# Patient Record
Sex: MALE | Race: WHITE
[De-identification: names, ages, dates, MRNs, and addresses within clinical notes are randomized per-mention and may not be internally consistent; named-entity substitution may affect disease eponyms.]

---

## 2017-10-31 ENCOUNTER — HOSPITAL ENCOUNTER (OUTPATIENT)
Dept: HOSPITAL 92 - ERS | Age: 62
Setting detail: OBSERVATION
LOS: 1 days | Discharge: HOME | End: 2017-11-01
Attending: INTERNAL MEDICINE | Admitting: INTERNAL MEDICINE
Payer: COMMERCIAL

## 2017-10-31 VITALS — BODY MASS INDEX: 37.3 KG/M2

## 2017-10-31 DIAGNOSIS — Z79.82: ICD-10-CM

## 2017-10-31 DIAGNOSIS — G93.41: ICD-10-CM

## 2017-10-31 DIAGNOSIS — E66.9: ICD-10-CM

## 2017-10-31 DIAGNOSIS — Z79.899: ICD-10-CM

## 2017-10-31 DIAGNOSIS — Z79.84: ICD-10-CM

## 2017-10-31 DIAGNOSIS — K21.9: ICD-10-CM

## 2017-10-31 DIAGNOSIS — E78.5: ICD-10-CM

## 2017-10-31 DIAGNOSIS — G45.9: Primary | ICD-10-CM

## 2017-10-31 DIAGNOSIS — Z79.02: ICD-10-CM

## 2017-10-31 DIAGNOSIS — E11.9: ICD-10-CM

## 2017-10-31 DIAGNOSIS — I10: ICD-10-CM

## 2017-10-31 DIAGNOSIS — Z90.49: ICD-10-CM

## 2017-10-31 DIAGNOSIS — Z91.041: ICD-10-CM

## 2017-10-31 DIAGNOSIS — Z90.79: ICD-10-CM

## 2017-10-31 DIAGNOSIS — J45.909: ICD-10-CM

## 2017-10-31 DIAGNOSIS — Z99.89: ICD-10-CM

## 2017-10-31 DIAGNOSIS — K58.9: ICD-10-CM

## 2017-10-31 DIAGNOSIS — G47.33: ICD-10-CM

## 2017-10-31 LAB
ALP SERPL-CCNC: 74 U/L (ref 40–150)
ALT SERPL W P-5'-P-CCNC: 30 U/L (ref 8–55)
ANION GAP SERPL CALC-SCNC: 17 MMOL/L (ref 10–20)
APAP SERPL-MCNC: (no result) MCG/ML (ref 10–30)
APTT PPP: 34.6 SEC (ref 22.9–36.1)
AST SERPL-CCNC: 24 U/L (ref 5–34)
BASOPHILS # BLD AUTO: 0.1 THOU/UL (ref 0–0.2)
BASOPHILS NFR BLD AUTO: 1.4 % (ref 0–1)
BILIRUB SERPL-MCNC: 0.5 MG/DL (ref 0.2–1.2)
BUN SERPL-MCNC: 12 MG/DL (ref 8.4–25.7)
CALCIUM SERPL-MCNC: 9.8 MG/DL (ref 7.8–10.44)
CHLORIDE SERPL-SCNC: 104 MMOL/L (ref 98–107)
CO2 SERPL-SCNC: 22 MMOL/L (ref 23–31)
CREAT CL PREDICTED SERPL C-G-VRATE: 0 ML/MIN (ref 70–130)
EOSINOPHIL # BLD AUTO: 0.2 THOU/UL (ref 0–0.7)
EOSINOPHIL NFR BLD AUTO: 2.7 % (ref 0–10)
GLOBULIN SER CALC-MCNC: 3.7 G/DL (ref 2.4–3.5)
HCT VFR BLD CALC: 42.7 % (ref 42–52)
LYMPHOCYTES # BLD: 1.8 THOU/UL (ref 1.2–3.4)
LYMPHOCYTES NFR BLD AUTO: 20.7 % (ref 21–51)
MODIFIED ALLEN'S TEST: POSITIVE
MONOCYTES # BLD AUTO: 0.7 THOU/UL (ref 0.11–0.59)
MONOCYTES NFR BLD AUTO: 7.9 % (ref 0–10)
NEUTROPHILS # BLD AUTO: 5.9 THOU/UL (ref 1.4–6.5)
PROTHROMBIN TIME: 13.3 SEC (ref 12–14.7)
RBC # BLD AUTO: 5.09 MILL/UL (ref 4.7–6.1)
SALICYLATES SERPL-MCNC: (no result) MG/DL (ref 15–30)
SODIUM SERPL-SCNC: 140 MMOL/L (ref 135–148)
VENT: NO
WBC # BLD AUTO: 8.8 THOU/UL (ref 4.8–10.8)

## 2017-10-31 PROCEDURE — G0378 HOSPITAL OBSERVATION PER HR: HCPCS

## 2017-10-31 PROCEDURE — 85610 PROTHROMBIN TIME: CPT

## 2017-10-31 PROCEDURE — 85303 CLOT INHIBIT PROT C ACTIVITY: CPT

## 2017-10-31 PROCEDURE — 84443 ASSAY THYROID STIM HORMONE: CPT

## 2017-10-31 PROCEDURE — 96374 THER/PROPH/DIAG INJ IV PUSH: CPT

## 2017-10-31 PROCEDURE — 80053 COMPREHEN METABOLIC PANEL: CPT

## 2017-10-31 PROCEDURE — 82805 BLOOD GASES W/O2 SATURATION: CPT

## 2017-10-31 PROCEDURE — 87040 BLOOD CULTURE FOR BACTERIA: CPT

## 2017-10-31 PROCEDURE — 70450 CT HEAD/BRAIN W/O DYE: CPT

## 2017-10-31 PROCEDURE — 85240 CLOT FACTOR VIII AHG 1 STAGE: CPT

## 2017-10-31 PROCEDURE — 70544 MR ANGIOGRAPHY HEAD W/O DYE: CPT

## 2017-10-31 PROCEDURE — 93880 EXTRACRANIAL BILAT STUDY: CPT

## 2017-10-31 PROCEDURE — 85305 CLOT INHIBIT PROT S TOTAL: CPT

## 2017-10-31 PROCEDURE — 85025 COMPLETE CBC W/AUTO DIFF WBC: CPT

## 2017-10-31 PROCEDURE — 85307 ASSAY ACTIVATED PROTEIN C: CPT

## 2017-10-31 PROCEDURE — 36416 COLLJ CAPILLARY BLOOD SPEC: CPT

## 2017-10-31 PROCEDURE — 85598 HEXAGNAL PHOSPH PLTLT NEUTRL: CPT

## 2017-10-31 PROCEDURE — 96361 HYDRATE IV INFUSION ADD-ON: CPT

## 2017-10-31 PROCEDURE — 93005 ELECTROCARDIOGRAM TRACING: CPT

## 2017-10-31 PROCEDURE — 70547 MR ANGIOGRAPHY NECK W/O DYE: CPT

## 2017-10-31 PROCEDURE — 80048 BASIC METABOLIC PNL TOTAL CA: CPT

## 2017-10-31 PROCEDURE — 80307 DRUG TEST PRSMV CHEM ANLYZR: CPT

## 2017-10-31 PROCEDURE — 82140 ASSAY OF AMMONIA: CPT

## 2017-10-31 PROCEDURE — 87086 URINE CULTURE/COLONY COUNT: CPT

## 2017-10-31 PROCEDURE — 70551 MRI BRAIN STEM W/O DYE: CPT

## 2017-10-31 PROCEDURE — 96375 TX/PRO/DX INJ NEW DRUG ADDON: CPT

## 2017-10-31 PROCEDURE — 86038 ANTINUCLEAR ANTIBODIES: CPT

## 2017-10-31 PROCEDURE — 82746 ASSAY OF FOLIC ACID SERUM: CPT

## 2017-10-31 PROCEDURE — 96372 THER/PROPH/DIAG INJ SC/IM: CPT

## 2017-10-31 PROCEDURE — 80061 LIPID PANEL: CPT

## 2017-10-31 PROCEDURE — 80306 DRUG TEST PRSMV INSTRMNT: CPT

## 2017-10-31 PROCEDURE — 85300 ANTITHROMBIN III ACTIVITY: CPT

## 2017-10-31 PROCEDURE — 85730 THROMBOPLASTIN TIME PARTIAL: CPT

## 2017-10-31 PROCEDURE — 82607 VITAMIN B-12: CPT

## 2017-10-31 PROCEDURE — 81240 F2 GENE: CPT

## 2017-10-31 PROCEDURE — 81003 URINALYSIS AUTO W/O SCOPE: CPT

## 2017-10-31 PROCEDURE — 85379 FIBRIN DEGRADATION QUANT: CPT

## 2017-10-31 PROCEDURE — 83090 ASSAY OF HOMOCYSTEINE: CPT

## 2017-10-31 PROCEDURE — 83036 HEMOGLOBIN GLYCOSYLATED A1C: CPT

## 2017-10-31 PROCEDURE — 36415 COLL VENOUS BLD VENIPUNCTURE: CPT

## 2017-10-31 NOTE — CT
BRAIN CT WITHOUT IV CONTRAST:

 

History: 

62-year-old male with altered mental status. 

 

FINDINGS: 

No focal mass or midline shift. No intra or extraaxial hemorrhage. Sinuses and mastoids are clear of
 acute process. 

 

IMPRESSION: 

No acute intracranial process. No mass or bleed.

 

POS: [facility]

## 2017-10-31 NOTE — HP
REASON FOR ADMISSION:  Acute encephalopathy, possible CVA.

 

HISTORY OF PRESENT ILLNESS:  The patient is a professor at [email or web address].  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 
this was around 12:00 noon.  When he came out, he was accompanied by one of his 
students.  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  He did not know 
the date and was appearing confused.  He had also complained of blurry vision.  
Currently, he has episodes of him not recollecting the sentence that he just 
said before.  He is able to move all 4 extremities.  No complaints of fever, 
cough, or expectoration.  No complaints of urinary symptoms including frequency 
or urgency.  He walked after the lecture and the student took him to his truck 
to get him to the emergency room.  He did not sway or complaint of dizziness.  
Currently, he has no chest pain or palpitation.  He drinks a lot of coffee, but 
is not sure if he was hydrating himself with water.  The patient responds to 
verbal questions appropriately, but seems to forget the answers he gave 
immediately.  He has not had any similar complaints before.  He recognizes his 
wife, daughter and the student who keeps saying that he is the one of students 
he has had.  He had a normal day yesterday with no untoward events.  Wife at 
bedside adds that his job is stressful as a professor.  The student at bedside 
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 
includes legal regulation, etc.

 

PAST MEDICAL AND SURGICAL HISTORY:  History of diabetes mellitus, type 2; asthma
, which is very mild with no recent flareups; obstructive sleep apnea; he is 
compliant with his CPAP machine; history of irritable bowel syndrome with 
frequent diarrhea, but no diarrhea in the last 24 hours.  In fact, he does not 
recollect when he had a loose stool.  Gastroesophageal reflux disease and 
hypertension.  Prostatectomy, cholecystectomy and TURP.

 

CURRENT MEDICATIONS:  Metformin 250 mg p.o. twice daily, aspirin 325 mg p.o. 
daily, omeprazole 20 mg p.o. twice daily, fish oil 1200 mg p.o. daily, Flonase 
1 spray to both nares daily, Singulair 10 mg daily, Coreg 12.5 mg p.o. twice 
daily.

 

ALLERGIES:  No known drug allergies, but apparently he is allergic to IODINE, 
which caused hives on previous occasion.

 

PERSONAL HISTORY:  Does not abuse alcohol or drugs.  No history of smoking.  He 
is a professor at [employer].

 

FAMILY HISTORY:  Mother is living and is 94 years old.  Father  of old age 
at 94 years, he had dementia as well.

 

REVIEW OF SYSTEMS:  The following complete review of systems was negative, 
unless otherwise mentioned in the HPI or below:

Constitutional:  Weight loss or gain, ability to conduct usual activities.

Skin:  Rash, itching.

Eyes:  Double vision, pain.

ENT/Mouth:  Nose bleeding, neck stiffness, pain, tenderness.

Cardiovascular:  Palpitations, dyspnea on exertion, orthopnea.

Respiratory:  Shortness of breath, wheezing, cough, hemoptysis, fever or night 
sweats.

Gastrointestinal:  Poor appetite, abdominal pain, heartburn, nausea, vomiting, 
constipation, or diarrhea.

Genitourinary:  Urgency, frequency, dysuria, nocturia.

Musculoskeletal:  Pain, swelling.

Neurologic/Psychiatric:  Anxiety, depression.

Allergy/Immunologic:  Skin rash, bleeding tendency.

 

PHYSICAL EXAMINATION:

GENERAL:  The patient is a 62-year-old male, who is currently not in any acute 
distress.

VITAL SIGNS:  Blood pressure 158/76, pulse 90 per minute, respiratory rate 20 
per minute, temperature 98.4 degrees Fahrenheit, saturating 100% on room air.

NECK:  Supple, no elevated JVD.

HEENT:  Eyes:  Extraocular muscles intact.  Pupils reacting to light.  Oral 
cavity, mucous membranes are dry.  No exudates or congestion.

CARDIOVASCULAR SYSTEM:  S1, S2 heard.  Regular rhythm.

RESPIRATORY SYSTEM:  Air entry 1+ bilaterally.  No rales or rhonchi.

ABDOMEN:  Soft, bowel sounds heard.  No tenderness, rigidity or guarding.

EXTREMITIES:  No peripheral edema or calf tenderness.

CENTRAL NERVOUS SYSTEM:  The patient is alert, awake, and oriented.  Cranial 
nerves are grossly intact.  Motor system, strength is 5/5, tone is normal.  
Reflexes are 2+ bilateral.  Cerebellar signs are grossly intact.  Gait was not 
tested.

PSYCHIATRIC:  The patient is a bit anxious at present.  Otherwise, no 
hallucinations or delusions.

 

LABORATORY AND X-RAY FINDINGS:  CT brain shows no acute infarct or bleed.  
There was no focal mass or midline shift seen.  EKG, normal sinus rhythm at 73 
beats per minute.  There are nonspecific ST-T wave changes.  Plasma alcohol was 
less than 10.  Urine drug screen was negative.  UA is negative for any 
infection.  Serum bicarbonate 22, BUN 12, creatinine 0.9.  Liver enzymes within 
normal limits.  Albumin is 4.3.  Blood gas shows a pH of 7.46, pCO2 is 33, 
white count of 8, H\T\H are 14 and 42, platelet count 286 with 67% neutrophils, 
MCV is 83.

 

CLINICAL IMPRESSION AND PLAN:  The patient will be under observation on the 
stroke unit for acute encephalopathy with possible cerebrovascular accident 
with cognitive deficits.  He has had a CT brain done, which has not shown any 
acute abnormalities.  The patient is allergic to IODINE, and we will obtain an 
MRI in the morning.  As the patient takes aspirin full dose at home, we will 
place him on Plavix for now.  We will continue his Coreg and Singulair as 
before.  He will be gently hydrated with normal saline at 125 mL per hour.  The 
patient appears to have mild dehydration in addition to him drinking a lot of 
coffee.  We will also place him on a small dose of Lipitor for now.  Neurology 
consultation and stroke evidence based protocol will be followed.  His CO2 
appears to be normal.  The patient's methemoglobin and carboxyhemoglobin levels 
are within normal limits as well on the blood gas.  We will obtain a TSH level 
stat and one more in the morning for an accurate level.  The reason behind his 
cognitive dysfunction is unclear at present.  Please note the patient does not 
appear to have any motor deficits.  He is a right-handed person with normal 
strength in all four extremities at present.

 

NOEMY

## 2017-11-01 VITALS — TEMPERATURE: 98 F

## 2017-11-01 LAB
ANION GAP SERPL CALC-SCNC: 11 MMOL/L (ref 10–20)
BASOPHILS # BLD AUTO: 0 THOU/UL (ref 0–0.2)
BASOPHILS NFR BLD AUTO: 0.6 % (ref 0–1)
BUN SERPL-MCNC: 12 MG/DL (ref 8.4–25.7)
CALCIUM SERPL-MCNC: 9 MG/DL (ref 7.8–10.44)
CHLORIDE SERPL-SCNC: 107 MMOL/L (ref 98–107)
CHOLEST SERPL-MCNC: 187 MG/DL
CO2 SERPL-SCNC: 25 MMOL/L (ref 23–31)
CREAT CL PREDICTED SERPL C-G-VRATE: 155 ML/MIN (ref 70–130)
EOSINOPHIL # BLD AUTO: 0.2 THOU/UL (ref 0–0.7)
EOSINOPHIL NFR BLD AUTO: 3.4 % (ref 0–10)
HCT VFR BLD CALC: 38.9 % (ref 42–52)
LDLC SERPL CALC-MCNC: 102 MG/DL
LYMPHOCYTES # BLD: 1.8 THOU/UL (ref 1.2–3.4)
LYMPHOCYTES NFR BLD AUTO: 25.6 % (ref 21–51)
MONOCYTES # BLD AUTO: 0.7 THOU/UL (ref 0.11–0.59)
MONOCYTES NFR BLD AUTO: 9.8 % (ref 0–10)
NEUTROPHILS # BLD AUTO: 4.3 THOU/UL (ref 1.4–6.5)
RBC # BLD AUTO: 4.51 MILL/UL (ref 4.7–6.1)
WBC # BLD AUTO: 7.1 THOU/UL (ref 4.8–10.8)

## 2017-11-01 NOTE — MRI
MR ANGIOGRAPHY OF BRAIN:

 

HISTORY:

Memory loss and confusion. Possible CVA.

 

TECHNIQUE:

Film performed without contrast using time-of-flight images.

 

FINDINGS:

Intracranial internal carotid arteries appear unremarkable.  The  anterior cerebral arteries, middle
 cerebral arteries, and posterior cerebral arteries appear unremarkable.  Basilar artery is unremark
able.

 

IMPRESSION:

Unremarkable magnetic resonance angiography of cerebral circulation.

 

[facility]: [facility]

## 2017-11-01 NOTE — CON
DATE OF CONSULTATION:  11/01/2017

 

LOCATION:  [address] [place] [address].

 

CONSULTING PHYSICIAN:  Dr. Quevedo.

 

REASON FOR CONSULTATION:  Acute episode of confusion, rule out stroke.

 

HISTORY OF PRESENT ILLNESS:  The history is obtained from the chart review and from the patient who 
was able to provide history.  His family members, his wife and his daughter were also present in the
 room who were able to provide history.  The patient is a 62-year-old right-handed male with a past 
medical history of diabetes, sleep apnea, who uses CPAP machine at home, but that has not been check
ed or evaluated for a long time, history of irritable bowel syndrome, reflux disease, hypertension, 
prostatectomy, who presented with the following symptoms.  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  The patient states 
that he does not remember anything.  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  He appeare
d to be confused.  According to the wife and the daughter, he was confused, the confusion started ar
ound 12:45 and lasted until last night.  He would ask the same questions, asked the date and the yea
r repeatedly.  He did not remember what he did in the class.  He denies any similar symptoms in the 
past.  He denied any chest pain, fever, cough, congestion, palpitation, dizziness.  There was no los
s of consciousness, no seizure activity reported.  There was no weakness noted.  No facial droop not
ed.  He said that his vision was blurry and foggy.  No speech changes.  He was able to walk without 
any focal weakness.  Because of the confusion, he was brought to the ER and was subsequently admitte
d to the hospital for further workup.

 

Currently, patient feels like he is back to his normal self.  He just has a dull headache.  He has h
istory of dull sinus headaches, but no history of migraine headaches, no prior history of seizures, 
no reported seizure activity with this episode.  No history of strokes in the past.  He only takes 3
25 mg aspirin at home and he is compliant with that.  When asked he denies any unusual level of stre
ss, he does mention that even though he uses CPAP at home every night, he does wake up multiple time
s at night with breathing issues.  He has not had his CPAP checked in a long time.  The CPAP has hel
ped with the snoring, but he still wakes up multiple times at night.  He has been feeling tired and 
fatigued for the last few weeks.

 

PAST MEDICAL HISTORY:  As mentioned in the HPI.

 

PAST SURGICAL HISTORY:  Prostatectomy, cholecystectomy, TURP.

 

HOME MEDICATIONS:  Please review the chart.

 

ALLERGIES:  Allergic to IODINE

 

SOCIAL HISTORY:  Does not abuse alcohol or drugs.  No history of smoking.

 

FAMILY HISTORY:  Dementia.

 

REVIEW OF SYSTEMS:  As mentioned in the HPI, otherwise negative.

 

PHYSICAL EXAMINATION:

VITAL SIGNS:  Temperature 98, pulse rate 70, respiratory rate 18, O2 sats 95% on room air, blood pre
ssure 167/93.  The patient's blood pressure was 150 systolic when he presented to the ER.

GENERAL:  Well-developed, well-nourished male, in no apparent distress.

HEENT:  Normocephalic, atraumatic.  Normal sclerae.

NECK:  Supple.

RESPIRATORY:  Clear to auscultation bilaterally.

CARDIOVASCULAR:  Regular rate and rhythm.

NEUROLOGIC:  The patient is awake, alert, oriented x3.  Speech and language intact.  No aphasia, no 
dysarthria noted.  Cranial nerves:  Pupils are reactive to light bilaterally.  Extraocular movements
 are intact.  No diplopia, no nystagmus.  Visual fields are full bilaterally.  No facial droop noted
.  Facial sensation intact and symmetric bilaterally.  Motor:  Normal tone and bulk.  The patient ha
d 5/5 strength in bilateral upper and lower extremities.  Sensation intact to fine touch throughout.
  Reflexes are 2+.  Coordination is intact to finger-nose-finger testing and heel-to-shin test bilat
erally.  Gait and Romberg not tested.

 

LABORATORY DATA:  CBC with normal white cell count, normal platelets.  Chemistry:  Normal electrolyt
es, hemoglobin A1c 5.2, glucose 96.  LFTs normal.  B12 is normal.  TSH normal.  Folic acid was chris
l.  Urinalysis did not show any signs of infection.  Urine drug screen noncontributory.  Plasma alco
hol level less than 10.

 

IMAGING:  The patient had CT head done on admission, which did not show any acute intracranial proce
ss.  No mass or bleed.  The patient had MRI brain done, which did not show any acute process, no acu
te infarct, no hemorrhage, no mass, no edema, showed mild chronic ischemic white matter changes.  Th
e patient had MR angiogram head, which was normal with no stenosis, no aneurysm.  MR angiogram neck,
 there was a question of maybe loss of signal at the origin of the left internal carotid artery.  Th
is was further evaluated by carotid Dopplers, which did not show any significant stenosis.

 

IMPRESSION:  Transient neurological deficit, transient episode of confusion and global amnesia, reso
lved.  The patient does not have history of migraine headaches.  There was no reported seizure activ
ity.  He does not have any prior history of seizures.  All his test results were negative.

 

RECOMMENDATIONS:

1.  Discussed with the patient and his family members the test results and explained that at this po
int we may have to assume that this could be TIA related, so recommended changing him from aspirin t
o Plavix.  This episode happened on 325 mg aspirin, which he was taking every day at home, so recomm
end switching him from aspirin to Plavix, explained the risk of bleeding with Plavix.  The patient u
nderstands the risk and benefits and agreed to be started on Plavix.

2.  Advised the patient to get his CPAP checked and evaluated because that could be the reason for h
is current episode.

3.  As mentioned before, there is no prior history of seizure and no seizure activity was reported. 
 So, no further workup was needed from that standpoint.  If patient continues to have more episodes 
then EEG can always be done in the future.

4.  Continue medical management per primary team.  No other recommendations from neurological standp
oint.  Advised the patient to follow up with his primary care doctor.  If the patient has more episo
bee, he can be followed up in the clinic as well.

## 2017-11-01 NOTE — MRI
MR ANGIOGRAPHY OF NECK:

11/1/17

 

Multiplanar and multisequential imaging of neck obtained without contrast images with time-of-flight
 images.

 

HISTORY: 

Memory loss and confusion. Assess for possible CVA.

 

There is mild signal loss at the origin of the left internal carotid artery. This appears to result 
in mild to moderate stenosis. This does not appear to be hemodynamically significant on MRI although
 suggests correlation with ultrasound velocity studies at this location. 

 

Common carotid and internal carotid arteries otherwise appear unremarkable. Vertebral arteries appea
r unremarkable.

 

IMPRESSION:  

Loss of signal at the origin of the left internal carotid artery. There is evidence of stenosis at t
his location and degree of stenosis is difficult to ascertain on these time-of-flight images. Recomm
end correlation with velocity studies. If velocities show evidence of significant stenosis, further 
evaluation with CT angio is recommended. 

 

[facility]: [facility]

## 2017-11-01 NOTE — MRI
MRI BRAIN WITHOUT CONTRAST:

 

Technique: Multiplanar, multisequential imaging of brain obtained. 

 

History: Mental status change. 

 

FINDINGS: 

Ventricles have normal size and position. No evidence of restricted diffusion. 

 

No mass or edema. Mild chronic ischemic white matter changes are noted. No evidence of hemorrhage. T
he intracranial internal carotid arteries and proximal cerebral arteries and basilar arteries show f
low voids. There are two small mucous retention cyst in the lower left maxillary, each measuring yeyo
roximately 1.0 cm. 

 

IMPRESSION: 

1. Mild chronic ischemic white matter change. No acute process. 

 

 

POS: [facility]

## 2017-11-01 NOTE — DIS
DATE OF ADMISSION:  10/31/2017

 

DATE OF DISCHARGE:  11/01/2017

 

PRIMARY CARE PHYSICIAN:  Colton Burton M.D.

 

DISCHARGE DIAGNOSES:

1.  Transient ischemic attack.

2.  Hypertension.

3.  Hyperlipidemia, primarily triglyceridemia.

4.  Diabetes mellitus, type 2.

5.  Severe obesity with BMI around 37.

6.  Acute metabolic encephalopathy secondary to #1.

 

CONSULTATIONS:  Neurology, Dr. Kaitlin Van.

 

PROCEDURES:  None.

 

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
7 was not acting like himself.  One of his students approached him after class, and noted that Mr. CHAPARRITA foote was not talking right and seemed confused.  At the conclusion of the class, the student took the
 patient to his truck and drove him to the emergency department in [facility].

 

There, he was taken and was found to be acutely disoriented.  He kept repeating things.  A CT of the
 brain without contrast was done, was unremarkable, and labs were normal.  They were unable to get C
T angiogram and perfusion, because a history of iodine allergy.  He was transferred here for further
 workup and evaluation.

 

HOSPITAL COURSE:  The patient was admitted to our hospital, who was admitted by Dr. Royal.  I 
have written orders already in anticipation of the patient's arrival for neuro monitoring, telemetry
 monitoring, labs, and radiographic studies.

 

Overnight, 10/31/2017 to 11/01/2017, the patient's mental status normalized.  He was cleared by PT, 
OT, and ST.  He had an MRI/MRA of the brain that showed chronic microvascular ischemic changes.  The
 MRA showed a possible stenosis of the origin of the left internal carotid artery.  A stat carotid D
oppler was performed that showed no hemodynamically significant lesions.  Normal velocities.  Neurol
mari was able to see the patient in the afternoon, 11/01/2017.  The patient was continued on his curr
ent anticoagulant regimen and discharged home with outpatient followup.

 

PHYSICAL EXAMINATION:  The patient was seen and examined on the day of discharge.

 

Discharge plan and disposition were discussed with the patient face-to-face at the bedside.

 

DISCHARGE MEDICATIONS:

1.  Aspirin 325 mg daily.

2.  Plavix 75 mg daily.

3.  Lipitor 20 mg p.o. at bedtime.

4.  Carvedilol 12.5 mg p.o. b.i.d.

5.  Singulair 10 mg p.o. daily.

6.  Omeprazole 20 mg daily.

7.  Metformin 250 mg p.o. b.i.d.

 

FOLLOWUP APPOINTMENTS

1.  PCP within a week.

2.  Neurology per their clinic.

 

DISCHARGE CONDITION:  Stable.

 

DISPOSITION:  Will be discharged home via private vehicle.

 

Patient is instructed to return to the emergency department for any recurrence of symptoms.

## 2017-11-02 VITALS — DIASTOLIC BLOOD PRESSURE: 89 MMHG | SYSTOLIC BLOOD PRESSURE: 161 MMHG

## 2017-11-04 NOTE — EKG
Test Reason : 

Blood Pressure : ***/*** mmHG

Vent. Rate : 073 BPM     Atrial Rate : 073 BPM

   P-R Int : 176 ms          QRS Dur : 068 ms

    QT Int : 362 ms       P-R-T Axes : 050 032 162 degrees

   QTc Int : 398 ms

 

Normal sinus rhythm

Nonspecific ST and T wave abnormality

Abnormal ECG

 

Confirmed by CLAY KIRK, HERO CASTREJON ([id number]),  JACKSON JAMESON (16) on 11/4/2017 6:40:31 PM

 

Referred By:  CLAY           Confirmed By:HERO WILLIAMSON MD

## 2018-01-16 ENCOUNTER — HOSPITAL ENCOUNTER (OUTPATIENT)
Dept: HOSPITAL 92 - LABBT | Age: 63
Discharge: HOME | End: 2018-01-16
Attending: INTERNAL MEDICINE
Payer: COMMERCIAL

## 2018-01-16 DIAGNOSIS — Z01.818: Primary | ICD-10-CM

## 2018-01-16 DIAGNOSIS — I35.0: ICD-10-CM

## 2018-01-16 LAB
ANION GAP SERPL CALC-SCNC: 13 MMOL/L (ref 10–20)
BUN SERPL-MCNC: 13 MG/DL (ref 8.4–25.7)
CALCIUM SERPL-MCNC: 9.6 MG/DL (ref 7.8–10.44)
CHLORIDE SERPL-SCNC: 107 MMOL/L (ref 98–107)
CO2 SERPL-SCNC: 23 MMOL/L (ref 23–31)
CREAT CL PREDICTED SERPL C-G-VRATE: 0 ML/MIN (ref 70–130)
GLUCOSE SERPL-MCNC: 103 MG/DL (ref 80–115)
HGB BLD-MCNC: 14.1 G/DL (ref 14–18)
MCH RBC QN AUTO: 29.6 PG (ref 27–31)
MCV RBC AUTO: 85.1 FL (ref 80–94)
PLATELET # BLD AUTO: 254 THOU/UL (ref 130–400)
POTASSIUM SERPL-SCNC: 4.1 MMOL/L (ref 3.5–5.1)
RBC # BLD AUTO: 4.77 MILL/UL (ref 4.7–6.1)
SODIUM SERPL-SCNC: 139 MMOL/L (ref 136–145)
WBC # BLD AUTO: 7.6 THOU/UL (ref 4.8–10.8)

## 2018-01-16 PROCEDURE — 80048 BASIC METABOLIC PNL TOTAL CA: CPT

## 2018-01-16 PROCEDURE — 85027 COMPLETE CBC AUTOMATED: CPT

## 2018-01-17 ENCOUNTER — HOSPITAL ENCOUNTER (OUTPATIENT)
Dept: HOSPITAL 92 - CCL | Age: 63
Discharge: HOME | End: 2018-01-17
Attending: INTERNAL MEDICINE
Payer: COMMERCIAL

## 2018-01-17 VITALS — BODY MASS INDEX: 38.2 KG/M2

## 2018-01-17 DIAGNOSIS — I10: ICD-10-CM

## 2018-01-17 DIAGNOSIS — Z86.73: ICD-10-CM

## 2018-01-17 DIAGNOSIS — I35.0: Primary | ICD-10-CM

## 2018-01-17 DIAGNOSIS — Z91.041: ICD-10-CM

## 2018-01-17 PROCEDURE — 93312 ECHO TRANSESOPHAGEAL: CPT

## 2018-01-17 NOTE — ECHO
TRANSESOPHAGEAL ECHOCARDIOGRAM:

 

DATE OF SERVICE:  01/17/18 

 

PREPROCEDURE DIAGNOSIS:

Aortic stenosis. 

 

POSTPROCEDURE DIAGNOSIS:

Aortic stenosis. 

 

DETAILS: 

This transesophageal echo was performed to evaluate the degree of aortic stenosis and aortic root.

 

The anesthesiology department provided sedation for the patient. Please see their notes for details. 


 

After adequate sedation was achieved, transesophageal probe was inserted into his mouth and into the 
esophagus without issues. Multiplanar views were then obtained.

 

FINDINGS: 

 

Left ventricle is normal size with normal systolic function, EF estimated at about 55-60%.  No region
al wall motion abnormalities.

 

Left atrium is mildly dilated.

 

Right atrium is mildly dilated.  

 

-------------------- septum has a small patent foramen ovale with mostly left to right shunt.

 

Right ventricle is normal size with normal systolic function.  

 

Mitral valve seems structurally normal.  There is mild MR, no stenosis.

 

The tricuspid valve is structurally normal.  There is mild TR, no stenosis.

 

Pulmonary valve is structurally normal.

 

Aortic valve is heavily calcified with decreased cusp opening.  There seems to be a raphe with fused 
left and noncoronary cusps, opening of valve  suggestive of bicuspid aortic valve.  Aortic valve area
 by planimetry was measured at 1.1 cm2.  There is moderate aortic stenosis.  There is also moderate a
ortic regurgitation.

 

Thoracic aorta is without significant atherosclerotic disease. No evidence of aneurysmal dilatations.
 It measures 4.0 at its biggest diameter in the ascending portion.  No dissections.

 

CONCLUSIONS:

1.  Normal systolic function, EF of 55-60%.

2.  Biatrial enlargement.

3.  ---------------- septum with a small patent foramen ovale with left to right shunt. 

4.  Mild MR, mild TR.

5.  Calcified aortic valve with decreased cusp opening.  Functionally bicuspid aortic valve with aort
ic valve area by planimetry at 1.1 cm2. Moderate aortic valve stenosis. Moderate aortic valve insuffi
ciency.

## 2018-03-21 ENCOUNTER — HOSPITAL ENCOUNTER (OUTPATIENT)
Dept: HOSPITAL 92 - LABBT | Age: 63
Discharge: HOME | End: 2018-03-21
Attending: INTERNAL MEDICINE
Payer: COMMERCIAL

## 2018-03-21 DIAGNOSIS — R94.39: ICD-10-CM

## 2018-03-21 DIAGNOSIS — Z01.818: Primary | ICD-10-CM

## 2018-03-21 LAB
APTT PPP: 33.9 SEC (ref 22.9–36.1)
HGB BLD-MCNC: 13.7 G/DL (ref 14–18)
INR PPP: 1.1
MCH RBC QN AUTO: 28.4 PG (ref 27–31)
MCV RBC AUTO: 84.7 FL (ref 80–94)
PLATELET # BLD AUTO: 254 THOU/UL (ref 130–400)
PROTHROMBIN TIME: 13.9 SEC (ref 12–14.7)
RBC # BLD AUTO: 4.83 MILL/UL (ref 4.7–6.1)
WBC # BLD AUTO: 6.8 THOU/UL (ref 4.8–10.8)

## 2018-03-21 PROCEDURE — 85027 COMPLETE CBC AUTOMATED: CPT

## 2018-03-21 PROCEDURE — 85610 PROTHROMBIN TIME: CPT

## 2018-03-21 PROCEDURE — 85730 THROMBOPLASTIN TIME PARTIAL: CPT

## 2018-03-21 PROCEDURE — 93005 ELECTROCARDIOGRAM TRACING: CPT

## 2018-03-21 PROCEDURE — 93010 ELECTROCARDIOGRAM REPORT: CPT

## 2018-03-21 NOTE — EKG
Test Reason : 

Blood Pressure : ***/*** mmHG

Vent. Rate : 067 BPM     Atrial Rate : 068 BPM

   P-R Int : 182 ms          QRS Dur : 076 ms

    QT Int : 366 ms       P-R-T Axes : 058 053 -04 degrees

   QTc Int : 386 ms

 

*** Poor data quality, interpretation may be adversely affected

Normal sinus rhythm

Normal ECG

 

Confirmed by ROB KIRK, DR. LÓPEZ ([id number]) on 3/21/2018 8:26:19 PM

 

Referred By:  JL           Confirmed By:DR. MARIBEL RIVAS MD

## 2018-03-23 ENCOUNTER — HOSPITAL ENCOUNTER (OUTPATIENT)
Dept: HOSPITAL 92 - CCL | Age: 63
Discharge: HOME | End: 2018-03-23
Attending: INTERNAL MEDICINE
Payer: COMMERCIAL

## 2018-03-23 VITALS — BODY MASS INDEX: 34.9 KG/M2

## 2018-03-23 DIAGNOSIS — R94.39: ICD-10-CM

## 2018-03-23 DIAGNOSIS — I67.9: ICD-10-CM

## 2018-03-23 DIAGNOSIS — Z79.02: ICD-10-CM

## 2018-03-23 DIAGNOSIS — Z91.041: ICD-10-CM

## 2018-03-23 DIAGNOSIS — Z79.899: ICD-10-CM

## 2018-03-23 DIAGNOSIS — Z86.73: ICD-10-CM

## 2018-03-23 DIAGNOSIS — I10: ICD-10-CM

## 2018-03-23 DIAGNOSIS — I35.0: Primary | ICD-10-CM

## 2018-03-23 DIAGNOSIS — Z79.84: ICD-10-CM

## 2018-03-23 PROCEDURE — 99153 MOD SED SAME PHYS/QHP EA: CPT

## 2018-03-23 PROCEDURE — C1769 GUIDE WIRE: HCPCS

## 2018-03-23 PROCEDURE — 93460 R&L HRT ART/VENTRICLE ANGIO: CPT

## 2018-03-23 PROCEDURE — 99152 MOD SED SAME PHYS/QHP 5/>YRS: CPT

## 2018-03-23 PROCEDURE — B2161ZZ FLUOROSCOPY OF RIGHT AND LEFT HEART USING LOW OSMOLAR CONTRAST: ICD-10-PCS | Performed by: INTERNAL MEDICINE

## 2018-03-23 PROCEDURE — B2111ZZ FLUOROSCOPY OF MULTIPLE CORONARY ARTERIES USING LOW OSMOLAR CONTRAST: ICD-10-PCS | Performed by: INTERNAL MEDICINE

## 2018-03-23 PROCEDURE — 93567 NJX CAR CTH SPRVLV AORTGRPHY: CPT

## 2018-03-23 PROCEDURE — 4A023N8 MEASUREMENT OF CARDIAC SAMPLING AND PRESSURE, BILATERAL, PERCUTANEOUS APPROACH: ICD-10-PCS | Performed by: INTERNAL MEDICINE

## 2018-03-23 PROCEDURE — 85347 COAGULATION TIME ACTIVATED: CPT

## 2018-04-02 ENCOUNTER — HOSPITAL ENCOUNTER (OUTPATIENT)
Dept: HOSPITAL 92 - BICCT | Age: 63
Discharge: HOME | End: 2018-04-02
Attending: INTERNAL MEDICINE
Payer: COMMERCIAL

## 2018-04-02 DIAGNOSIS — I70.0: ICD-10-CM

## 2018-04-02 DIAGNOSIS — I71.2: Primary | ICD-10-CM

## 2018-04-02 PROCEDURE — 71275 CT ANGIOGRAPHY CHEST: CPT

## 2018-04-19 ENCOUNTER — HOSPITAL ENCOUNTER (OUTPATIENT)
Dept: HOSPITAL 92 - SLEEPLAB | Age: 63
Discharge: HOME | End: 2018-04-19
Attending: FAMILY MEDICINE
Payer: COMMERCIAL

## 2018-04-19 DIAGNOSIS — I10: ICD-10-CM

## 2018-04-19 DIAGNOSIS — G47.33: Primary | ICD-10-CM

## 2018-04-19 PROCEDURE — 95811 POLYSOM 6/>YRS CPAP 4/> PARM: CPT

## 2018-05-23 ENCOUNTER — HOSPITAL ENCOUNTER (INPATIENT)
Dept: HOSPITAL 92 - SURG A | Age: 63
LOS: 4 days | Discharge: HOME | DRG: 220 | End: 2018-05-27
Attending: THORACIC SURGERY (CARDIOTHORACIC VASCULAR SURGERY) | Admitting: THORACIC SURGERY (CARDIOTHORACIC VASCULAR SURGERY)
Payer: COMMERCIAL

## 2018-05-23 ENCOUNTER — HOSPITAL ENCOUNTER (OUTPATIENT)
Dept: HOSPITAL 92 - LABBT | Age: 63
Discharge: HOME | End: 2018-05-23
Attending: THORACIC SURGERY (CARDIOTHORACIC VASCULAR SURGERY)
Payer: COMMERCIAL

## 2018-05-23 VITALS — BODY MASS INDEX: 34.2 KG/M2

## 2018-05-23 DIAGNOSIS — Z79.51: ICD-10-CM

## 2018-05-23 DIAGNOSIS — Z79.899: ICD-10-CM

## 2018-05-23 DIAGNOSIS — I10: ICD-10-CM

## 2018-05-23 DIAGNOSIS — E78.5: ICD-10-CM

## 2018-05-23 DIAGNOSIS — E66.01: ICD-10-CM

## 2018-05-23 DIAGNOSIS — I35.0: ICD-10-CM

## 2018-05-23 DIAGNOSIS — Z01.818: Primary | ICD-10-CM

## 2018-05-23 DIAGNOSIS — Q21.1: ICD-10-CM

## 2018-05-23 DIAGNOSIS — Z86.73: ICD-10-CM

## 2018-05-23 DIAGNOSIS — Z79.84: ICD-10-CM

## 2018-05-23 DIAGNOSIS — Z88.8: ICD-10-CM

## 2018-05-23 DIAGNOSIS — E11.9: ICD-10-CM

## 2018-05-23 DIAGNOSIS — K58.9: ICD-10-CM

## 2018-05-23 DIAGNOSIS — Z91.048: ICD-10-CM

## 2018-05-23 DIAGNOSIS — G47.33: ICD-10-CM

## 2018-05-23 DIAGNOSIS — I35.9: Primary | ICD-10-CM

## 2018-05-23 LAB
ANION GAP SERPL CALC-SCNC: 10 MMOL/L (ref 10–20)
APTT PPP: 37.7 SEC (ref 22.9–36.1)
BUN SERPL-MCNC: 10 MG/DL (ref 8.4–25.7)
CALCIUM SERPL-MCNC: 9.5 MG/DL (ref 7.8–10.44)
CHLORIDE SERPL-SCNC: 107 MMOL/L (ref 98–107)
CO2 SERPL-SCNC: 25 MMOL/L (ref 23–31)
CREAT CL PREDICTED SERPL C-G-VRATE: 0 ML/MIN (ref 70–130)
GLUCOSE SERPL-MCNC: 96 MG/DL (ref 80–115)
HGB BLD-MCNC: 13.8 G/DL (ref 14–18)
INR PPP: 1
MCH RBC QN AUTO: 30.7 PG (ref 27–31)
MCV RBC AUTO: 86.5 FL (ref 80–94)
PLATELET # BLD AUTO: 222 THOU/UL (ref 130–400)
POTASSIUM SERPL-SCNC: 4.2 MMOL/L (ref 3.5–5.1)
PROTHROMBIN TIME: 13.4 SEC (ref 12–14.7)
RBC # BLD AUTO: 4.5 MILL/UL (ref 4.7–6.1)
SODIUM SERPL-SCNC: 138 MMOL/L (ref 136–145)
WBC # BLD AUTO: 7 THOU/UL (ref 4.8–10.8)

## 2018-05-23 PROCEDURE — 93798 PHYS/QHP OP CAR RHAB W/ECG: CPT

## 2018-05-23 PROCEDURE — 93010 ELECTROCARDIOGRAM REPORT: CPT

## 2018-05-23 PROCEDURE — 93005 ELECTROCARDIOGRAM TRACING: CPT

## 2018-05-23 PROCEDURE — 94002 VENT MGMT INPAT INIT DAY: CPT

## 2018-05-23 PROCEDURE — 80048 BASIC METABOLIC PNL TOTAL CA: CPT

## 2018-05-23 PROCEDURE — 85730 THROMBOPLASTIN TIME PARTIAL: CPT

## 2018-05-23 PROCEDURE — 36430 TRANSFUSION BLD/BLD COMPNT: CPT

## 2018-05-23 PROCEDURE — 94150 VITAL CAPACITY TEST: CPT

## 2018-05-23 PROCEDURE — 86850 RBC ANTIBODY SCREEN: CPT

## 2018-05-23 PROCEDURE — 36416 COLLJ CAPILLARY BLOOD SPEC: CPT

## 2018-05-23 PROCEDURE — 85025 COMPLETE CBC W/AUTO DIFF WBC: CPT

## 2018-05-23 PROCEDURE — 85610 PROTHROMBIN TIME: CPT

## 2018-05-23 PROCEDURE — 71045 X-RAY EXAM CHEST 1 VIEW: CPT

## 2018-05-23 PROCEDURE — 86901 BLOOD TYPING SEROLOGIC RH(D): CPT

## 2018-05-23 PROCEDURE — 85027 COMPLETE CBC AUTOMATED: CPT

## 2018-05-23 PROCEDURE — P9045 ALBUMIN (HUMAN), 5%, 250 ML: HCPCS

## 2018-05-23 PROCEDURE — 86900 BLOOD TYPING SEROLOGIC ABO: CPT

## 2018-05-23 PROCEDURE — 82805 BLOOD GASES W/O2 SATURATION: CPT

## 2018-05-23 PROCEDURE — 36415 COLL VENOUS BLD VENIPUNCTURE: CPT

## 2018-05-23 NOTE — HP
HISTORY OF PRESENT ILLNESS:  Mr. Flores is a 62-year-old gentleman who is being admitted for aortic magdalena
ve replacement and closure of the chronic PFO.

 

Echo showed a valve area of 1.1 cm with fused commissure resulting in a physiologic bicuspid valve.  
He had some component of aortic insufficiency.  Ejection fraction was 60%.  He had a small PFO with l
eft to right shunt.  At cardiac catheterization, he had no obstructive coronary disease.  CT of the [facility] showed ascending aorta with maximum diameter of 4 cm, which is unchanged from his previous CT in
 2008.

 

He had been admitted to [place] with a possible TIA in the past.  He had a comprehensive workup that w
as all negative except for the above LEANDRA findings.  He was kept on Plavix until his cardiac catheteri
zation, which was performed in April.  He has had no syncope or rhythm disturbances.  He has no histo
ry of congestive heart failure symptoms and he has no chest pain.  He has lost approximately 40 pound
s over the last couple months purposefully.  He has no claudication or rest pain.

 

PAST MEDICAL HISTORY:

1.  Irritable bowel syndrome.

2.  Hyperlipidemia.

3.  Obesity.

4.  Obstructive sleep apnea.

5.  Aortic valve stenosis and insufficiency.

6.  Hypertension.

7.  History of metabolic encephalopathy in the past.

 

PAST SURGICAL HISTORY:

1.  Prostatectomy.

2.  Hernia repair.

3.  Cholecystectomy.

 

SOCIAL HISTORY:  Nonsmoker.

 

ALLERGIES:  IODINE, MOEXIPRIL.

 

CURRENT MEDICATIONS:

1.  Fish oil daily.

2.  Omeprazole 20 mg every day.

3.  Benadryl at bedtime.

4.  Fluticasone 1 spray in each nostril daily.

5.  Cetirizine 10 mg every day.

6.  Atorvastatin 20 mg daily.

7.  Singulair 10 mg every day,

8.  Astelin 1 puff in each nostril twice a day.

9.  Metformin 500 mg 1/2 tablet b.i.d. - this is for weight loss.

10.  Carvedilol 12.5 mg b.i.d.

 

PHYSICAL EXAMINATION:

GENERAL:  This is a well-developed, well-nourished man, resting comfortably in office.

VITAL SIGNS:  Heart rate is 82 and regular, blood pressure 145/81, height 6 feet 1 inches, weight is 
254 pounds.

NECK:  Supple.  He has bilateral carotid bruits - ultrasound at [place] notes significant carotid 
stenosis.

HEART:  Rhythm is regular.  He has harsh systolic ejection murmur heard throughout the precordium.  T
here is no rubs.

LUNGS:  Clear bilaterally.

ABDOMEN:  Soft and nontender.

EXTREMITIES:  There is no edema.

VASCULAR:  He has palpable carotid, radial, femoral, dorsalis pedis, and posterior tibial pulses bila
terally.

PSYCHIATRIC:  The patient is awake, alert, and oriented to person, place, and time.

 

ASSESSMENT AND PLAN:  A 62-year-old gentleman with severe aortic stenosis and a component of aortic i
nsufficiency.  He also has a PFO.  We discussed PFO closure and aortic valve replacement with biologi
c valve.  He is agreeable to proceed.

## 2018-05-23 NOTE — EKG
Test Reason : 

Blood Pressure : ***/*** mmHG

Vent. Rate : 067 BPM     Atrial Rate : 067 BPM

   P-R Int : 180 ms          QRS Dur : 078 ms

    QT Int : 380 ms       P-R-T Axes : 051 061 020 degrees

   QTc Int : 401 ms

 

Normal sinus rhythm

Normal ECG

When compared with ECG of 21-MAR-2018 09:41,

No significant change was found

Confirmed by ROB KIRK, DR. LÓPEZ (4) on 5/23/2018 8:34:41 PM

 

Referred By:  DEZ           Confirmed By:DR. MARIBEL RIVAS MD

## 2018-05-23 NOTE — HP
HISTORY OF PRESENT ILLNESS:  Mr. Flores is a 62-year-old gentleman who is being admitted for aortic magdalena
ve replacement and closure of the chronic PFO.

 

Echo showed a valve area of 1.1 cm with fused commissure resulting in a physiologic bicuspid valve.  
He had some component of aortic insufficiency.  Ejection fraction was 60%.  He had a small PFO with l
eft to right shunt.  At cardiac catheterization, he had no obstructive coronary disease.  CT of the [facility] showed ascending aorta with maximum diameter of 4 cm, which is unchanged from his previous CT in
 2008.

 

He had been admitted to [facility] with a possible TIA in the past.  He had a comprehensive workup that w
as all negative except for the above LEANDRA findings.  He was kept on Plavix until his cardiac catheteri
zation, which was performed in April.  He has had no syncope or rhythm disturbances.  He has no histo
ry of congestive heart failure symptoms and he has no chest pain.  He has lost approximately 40 pound
s over the last couple months purposefully.  He has no claudication or rest pain.

 

PAST MEDICAL HISTORY:

1.  Irritable bowel syndrome.

2.  Hyperlipidemia.

3.  Obesity.

4.  Obstructive sleep apnea.

5.  Aortic valve stenosis and insufficiency.

6.  Hypertension.

7.  History of metabolic encephalopathy in the past.

 

PAST SURGICAL HISTORY:

1.  Prostatectomy.

2.  Hernia repair.

3.  Cholecystectomy.

 

SOCIAL HISTORY:  Nonsmoker.

 

ALLERGIES:  IODINE, MOEXIPRIL.

 

CURRENT MEDICATIONS:

1.  Fish oil daily.

2.  Omeprazole 20 mg every day.

3.  Benadryl at bedtime.

4.  Fluticasone 1 spray in each nostril daily.

5.  Cetirizine 10 mg every day.

6.  Atorvastatin 20 mg daily.

7.  Singulair 10 mg every day,

8.  Astelin 1 puff in each nostril twice a day.

9.  Metformin 500 mg 1/2 tablet b.i.d. - this is for weight loss.

10.  Carvedilol 12.5 mg b.i.d.

 

PHYSICAL EXAMINATION:

GENERAL:  This is a well-developed, well-nourished man, resting comfortably in office.

VITAL SIGNS:  Heart rate is 82 and regular, blood pressure 145/81, height 6 feet 1 inches, weight is 
254 pounds.

NECK:  Supple.  He has bilateral carotid bruits - ultrasound at [place] notes significant carotid 
stenosis.

HEART:  Rhythm is regular.  He has harsh systolic ejection murmur heard throughout the precordium.  T
here is no rubs.

LUNGS:  Clear bilaterally.

ABDOMEN:  Soft and nontender.

EXTREMITIES:  There is no edema.

VASCULAR:  He has palpable carotid, radial, femoral, dorsalis pedis, and posterior tibial pulses bila
terally.

PSYCHIATRIC:  The patient is awake, alert, and oriented to person, place, and time.

 

ASSESSMENT AND PLAN:  A 62-year-old gentleman with severe aortic stenosis and a component of aortic i
nsufficiency.  He also has a PFO.  We discussed PFO closure and aortic valve replacement with biologi
c valve.  He is agreeable to proceed.

## 2018-05-24 LAB
ANALYZER IN CARDIO: (no result)
ANALYZER IN CARDIO: (no result)
ANION GAP SERPL CALC-SCNC: 5 MMOL/L (ref 10–20)
APTT PPP: 36.4 SEC (ref 22.9–36.1)
BASE EXCESS STD BLDA CALC-SCNC: -3.1 MEQ/L
BASE EXCESS STD BLDA CALC-SCNC: -3.2 MEQ/L
BASOPHILS # BLD AUTO: 0 THOU/UL (ref 0–0.2)
BASOPHILS NFR BLD AUTO: 0.1 % (ref 0–1)
BUN SERPL-MCNC: 11 MG/DL (ref 8.4–25.7)
CA-I BLDA-SCNC: 1.2 MMOL/L (ref 1.12–1.3)
CA-I BLDA-SCNC: 1.2 MMOL/L (ref 1.12–1.3)
CALCIUM SERPL-MCNC: 7.9 MG/DL (ref 7.8–10.44)
CHLORIDE SERPL-SCNC: 115 MMOL/L (ref 98–107)
CO2 SERPL-SCNC: 23 MMOL/L (ref 23–31)
CREAT CL PREDICTED SERPL C-G-VRATE: 155 ML/MIN (ref 70–130)
EOSINOPHIL # BLD AUTO: 0.1 THOU/UL (ref 0–0.7)
EOSINOPHIL NFR BLD AUTO: 0.6 % (ref 0–10)
GLUCOSE SERPL-MCNC: 141 MG/DL (ref 80–115)
HCO3 BLDA-SCNC: 19.9 MEQ/L (ref 22–26)
HCO3 BLDA-SCNC: 22.1 MEQ/L (ref 22–26)
HCT VFR BLDA CALC: 31.9 % (ref 42–52)
HCT VFR BLDA CALC: 34.7 % (ref 42–52)
HGB BLD-MCNC: 11.8 G/DL (ref 14–18)
HGB BLD-MCNC: 12.4 G/DL (ref 14–18)
HGB BLDA-MCNC: 11.5 G/DL (ref 14–18)
HGB BLDA-MCNC: 12.4 G/DL (ref 14–18)
INR PPP: 1.3
LYMPHOCYTES # BLD: 1.1 THOU/UL (ref 1.2–3.4)
LYMPHOCYTES NFR BLD AUTO: 6 % (ref 21–51)
MCH RBC QN AUTO: 29.6 PG (ref 27–31)
MCV RBC AUTO: 87.7 FL (ref 80–94)
MONOCYTES # BLD AUTO: 1 THOU/UL (ref 0.11–0.59)
MONOCYTES NFR BLD AUTO: 5.6 % (ref 0–10)
NEUTROPHILS # BLD AUTO: 15.5 THOU/UL (ref 1.4–6.5)
NEUTROPHILS NFR BLD AUTO: 87.7 % (ref 42–75)
O2 A-A PPRESDIFF RESPIRATORY: 64.67 MM[HG] (ref 0–20)
PCO2 BLDA: 29.3 MMHG (ref 35–45)
PCO2 BLDA: 40.6 MMHG (ref 35–45)
PH BLDA: 7.35 [PH] (ref 7.35–7.45)
PH BLDA: 7.45 [PH] (ref 7.35–7.45)
PLATELET # BLD AUTO: 167 THOU/UL (ref 130–400)
PO2 BLDA: 111.1 MMHG (ref 80–100)
PO2 BLDA: 144.8 MMHG (ref 80–100)
POTASSIUM SERPL-SCNC: 4 MMOL/L (ref 3.5–5.1)
POTASSIUM SERPL-SCNC: 4.2 MMOL/L (ref 3.5–5.1)
PROTHROMBIN TIME: 16.8 SEC (ref 12–14.7)
RBC # BLD AUTO: 3.99 MILL/UL (ref 4.7–6.1)
SODIUM SERPL-SCNC: 139 MMOL/L (ref 136–145)
SPECIMEN DRAWN FROM PATIENT: (no result)
SPECIMEN DRAWN FROM PATIENT: (no result)
WBC # BLD AUTO: 17.6 THOU/UL (ref 4.8–10.8)

## 2018-05-24 PROCEDURE — 02RF08Z REPLACEMENT OF AORTIC VALVE WITH ZOOPLASTIC TISSUE, OPEN APPROACH: ICD-10-PCS | Performed by: THORACIC SURGERY (CARDIOTHORACIC VASCULAR SURGERY)

## 2018-05-24 RX ADMIN — Medication SCH GM: at 21:25

## 2018-05-24 RX ADMIN — Medication SCH GM: at 14:00

## 2018-05-24 RX ADMIN — INSULIN HUMAN PRN UNIT: 100 INJECTION, SOLUTION PARENTERAL at 13:57

## 2018-05-24 RX ADMIN — VANCOMYCIN HYDROCHLORIDE SCH MLS: 1 INJECTION, POWDER, LYOPHILIZED, FOR SOLUTION INTRAVENOUS at 21:05

## 2018-05-24 RX ADMIN — POTASSIUM CHLORIDE, DEXTROSE MONOHYDRATE AND SODIUM CHLORIDE SCH MLS: 150; 5; 450 INJECTION, SOLUTION INTRAVENOUS at 12:36

## 2018-05-24 RX ADMIN — INSULIN HUMAN PRN UNIT: 100 INJECTION, SOLUTION PARENTERAL at 16:31

## 2018-05-24 RX ADMIN — AMIODARONE HYDROCHLORIDE SCH MLS: 50 INJECTION, SOLUTION INTRAVENOUS at 21:04

## 2018-05-24 NOTE — CON
DATE OF CONSULTATION:  05/24/2018

 

REASON FOR CONSULTATION:  Postoperative care.

 

HISTORY OF PRESENT ILLNESS:  Mr. Flores is a very pleasant 62-year-old white gentleman, well-known to sergo cortezelf, who comes to the hospital for planned aortic valve replacement and PFO closure.  He presented 
initially for episodes of syncope.  He had a workup that included an echocardiogram that was inconclu
sive as far as the degree of aortic stenosis.  Transesophageal echo showed the same findings by plani
metry was 1.1 cm, and he had a patent foramen ovale.  Secondary to him continuing to have episodes of
 syncope and significant symptoms concerning for severe aortic stenosis including shortness of breath
 with exertion.  Right and left heart catheterization were performed that showed a valve area of 0.7 
sq cm and a mean gradient of 46 mmHg, making this valve to be severe.  During that evaluation, he had
 an aortic root measured at 4.2 cm, so a CT angiogram of the aorta was performed that showed a maximu
m diameter of the aorta was 4.0.  He was referred to see Dr. Wolff for this and he underwent aortic v
alve replacement earlier today along with PFO closure.  He did well.  He currently remains intubated,
 but he is wide awake and following commands.  He is close to being extubated enough.

 

PAST MEDICAL HISTORY:

1.  Irritable bowel syndrome.

2.  Hyperlipidemia.

3.  Obesity.

4.  Obstructive sleep apnea.

5.  Aortic stenosis and insufficiency.

6.  Hypertension.

7.  Metabolic encephalopathy in the past.

 

PAST SURGICAL HISTORY:

1.  Prostatectomy.

2.  Hernia.

3.  Cholecystectomy.

4.  PFO closure today.

 

SOCIAL HISTORY:  No alcohol, tobacco or drugs.

 

ALLERGIES:  IODINE, MOEXIPRIL.

 

OUTPATIENT MEDICATIONS:  Include,

1.  Fish oil daily.

2.  Omeprazole 20 mg a day.

3.  Benadryl.

4.  Fluticasone.

5.  Cetirizine.

6.  Atorvastatin 20 mg b.i.d.

7.  Singulair 10 mg b.i.d.

8.  Astelin.

9.  Metformin 500 mg half tablet b.i.d.

10.  Carvedilol 12.5 mg p.o. b.i.d.

 

FAMILY HISTORY:  Noncontributory.

 

REVIEW OF SYSTEMS:  A 12-point review of systems was done and is all negative unless stated in the hi
story of present illness.

 

PHYSICAL EXAMINATION:

VITAL SIGNS:  Temperature 98.0, pulse 73, respiration rate 12, satting 99% on 30% FiO2.  Blood pressu
re 149/72.

GENERAL:  Awake, alert, remains intubated.

HEENT:  Normocephalic, atraumatic.

NECK:  Supple.

LUNGS:  Clear.

CARDIOVASCULAR:  S1, S2.  There is a 2-3/6 systolic murmur at right upper sternal border.

ABDOMEN:  Soft.

EXTREMITIES:  Trace edema.

SKIN:  Warm and dry.

 

LABORATORY WORK:  Reviewed.  White count of 17, hemoglobin of 11, hematocrit 35, platelet count 167. 
 Coags were reviewed.  Chemistries were reviewed.  Normal BUN and creatinine and GFR of greater than 
90.

 

Chest x-ray post-surgery shows post-surgical changes of aortic valve replacement and right subclavian
 central catheter into the right IJ, needs to be repositioned bibasilar atelectases.

 

ASSESSMENT:

1.  Severe aortic stenosis and moderate aortic insufficiency, status post aortic valve replacement.

2.  Patent foramen ovale, status post PFO repair.

3.  Type 2 diabetes.

4.  Sleep apnea.

5.  Obesity.

 

PLAN:

1.  Continue postoperative care.

2.  Continue supportive care.

3.  Increase PT once tolerated.

4.  Aspirin for life.

5.  Statin for life given his mild coronary artery disease.

 

Thank you for letting us participate in the care of your patient.  We will follow.

## 2018-05-24 NOTE — OP
DATE OF PROCEDURE:  05/24/2018

 

PREOPERATIVE DIAGNOSES:  Aortic stenosis/insufficiency/patent foramen ovale/
hyperlipidemia/hypertension/morbid obesity/obstructive sleep apnea/history of 
transient ischemic attack in 10/2017.

 

POSTOPERATIVE DIAGNOSES:  Aortic stenosis/insufficiency/patent foramen ovale/
hyperlipidemia/hypertension/morbid obesity/obstructive sleep apnea/history of 
transient ischemic attack in 10/2017.

 

PROCEDURES:

1.  Aortic valve replacement #25 Magna bioprosthetic valve.

2.  Repair of patent foramen ovale.

 

SURGEON:  Dr. Kwadwo Wolff and Dr. Earl Sabillon.

 

ANESTHESIA:  General endotracheal, Dr. Cabrera Baird.

 

PUMP TIME:  88 minutes.

 

CROSS-CLAMP TIME:  60 minutes.

 

LOW CORE TEMP:  32-degree Celsius.

 

PERFUSIONIST:  Hafsa Khan.

 

DRAINS:  24-Anguillan chest tubes x2.

 

DRIPS:  None.

 

TRANSFUSIONS:  None.

 

DESCRIPTION OF PROCEDURE:  After consent was obtained, patient was brought to 
the operating room and placed supine on the operating room table.  Appropriate 
anesthetic monitor was placed and general endotracheal anesthesia induced.  
Chest, abdomen, and legs were prepped and draped in usual sterile fashion.  
Median sternotomy was performed.  Thymic fat and pericardium were divided with 
electrocautery.  Pericardial stay sutures were placed.  Patient was 
systemically heparinized.  Aortic and bicaval cannulation sutures were placed.  
The aortic cannulation sutures caused a sub adventitial hematoma, which bled 
significantly.  A third pledgeted 4-0 Prolene suture was then placed to allow 
for hemostasis.  After adequate heparinization, aortic and bicaval cannulation 
was performed.  Cable tapes were placed.  Retrograde prime was performed.  The 
patient was placed on cardiopulmonary bypass. A left ventricular sump drain was 
placed in the right superior pulmonary vein.  Aortic cross-clamp was applied 
and antegrade sanguinous cardioplegic arrest obtained.  One liter of antegrade 
cold [place] cardioplegia was given.  Topical cold solution was used.  Caval 
tapes were tightened.  CO2 was infused in the pericardium throughout the open 
portion of the procedure.  Right atriotomy was performed.  The area of patent 
foramen ovale was identified and closed with a running 4-0 Prolene suture.  
This was tested by a hand held maximal ventilated breath.  There was no 
evidence of leak.  Atriotomy was closed in a dual layer running fashion with 4-
0 Prolene suture.  A transverse aortotomy was performed.  Aortic stay sutures 
were placed.  The valve was inspected.  It was a three-leaflet valve.  The left 
and right commissures were fused making in a functional bileaflet valve.  Valve 
leaflets were debrided.  Leaflets were heavily calcified.  Hinge points had 
multiple areas of heavy calcification.  These areas were debrided with 
rongeurs.  After adequate debriding in the aortic annulus, 3 Pledgeted 2-0 
Ethibond sutures were placed in the commissures.  Valve was measured to be a 
25.  Pledgeted 2-0 Ethibond sutures were then placed circumferentially in the 
annulus.  These were passed through the sewing ring of the valve and the valve 
was seated.  Valve sutures were secured with core knots.  The valve seated 
nicely and all core knots were tightened.  The aortotomy was closed with 
pledgetted 4-0 Prolene suture.  Prior to completion of the suture line, 
ventilation was reinstituted and heart was deaired.  After adequate de-airing 
sutures were tied.  Patient was placed in Trendelenburg position and the aortic 
cross clamp removed.  Ventricular pacing wires were placed.  Twenty-four Anguillan 
chest tubes were placed x2 in the mediastinum.  The inferior vena caval cannula 
was removed and its pursestring sutures secured with good hemostasis.  The 
patient was warmed and weaned from cardiopulmonary bypass.  After resumption of 
sinus rhythm, good hemodynamics, temperature greater than 36.5, bypass was 
discontinued.  Atrial cannula was removed and their pursestring sutures 
secured.  Protamine was administered.  The aortic cannula was removed and its 
pursestring along with a pledgeted 4-0 Prolene were secured with good 
hemostasis.  There was no further expansion of sub adventitial hematoma.  
Antegrade cardioplegia needle was removed and secured with its site secured 
with a pledgeted 4-0 Prolene suture.  Sump drain was removed and its 
pursestring sutures secured.  After adequate hemostasis had been obtained, 
sternum was treated with vancomycin paste and closed with #7 wire.  Sternum was 
treated with platelet-rich plasma and wires twisted.  Wounds were irrigated, 
treated with platelet-poor plasma, and closed in multiple layers.  Patient 
tolerated procedure well and was transferred to the intensive care unit in 
stable condition.

 

NOEMY

## 2018-05-24 NOTE — RAD
AP CHEST:

 

Indication: Post op open heart. 

 

Comparison: Prior exam dated 8-14-14. 

 

FINDINGS: 

Patient remains intubated. There is midline sternotomy change. There is an aortic valvular prosthesis
 that overlies the central aspect of the heart shadow. There is bibasilar atelectasis. No pneumothora
x is evident. There is a right sided subclavian central venous catheter that projects up the right in
ternal jugular. Would recommend repositioning. 

 

IMPRESSION: 

1. Post-surgical changes of aortic valvular placement. 

2. Right subclavian central venous catheter projecting up to the right IJ. Would recommend reposition
ing. 

3. Bibasilar atelectasis. 

 

Findings called to Charo Low RN, caring for this patient at 11:54 a.m. on 5-24-18.

 

Code CR

 

POS: RODRÍGUEZ

## 2018-05-24 NOTE — EKG
Test Reason : POST CABG

Blood Pressure : ***/*** mmHG

Vent. Rate : 076 BPM     Atrial Rate : 076 BPM

   P-R Int : 166 ms          QRS Dur : 082 ms

    QT Int : 366 ms       P-R-T Axes : 051 069 060 degrees

   QTc Int : 411 ms

 

Normal sinus rhythm

Nonspecific T wave abnormality

Abnormal ECG

When compared with ECG of 23-MAY-2018 10:09,

No significant change was found

Confirmed by ROB KIRK,  SLu (4) on 5/24/2018 8:31:00 PM

 

Referred By: SHREYA GARCES           Confirmed By:DR. MARIBEL RIVAS MD

## 2018-05-25 LAB
ANION GAP SERPL CALC-SCNC: 7 MMOL/L (ref 10–20)
BASOPHILS # BLD AUTO: 0 THOU/UL (ref 0–0.2)
BASOPHILS NFR BLD AUTO: 0.1 % (ref 0–1)
BUN SERPL-MCNC: 13 MG/DL (ref 8.4–25.7)
CALCIUM SERPL-MCNC: 8.3 MG/DL (ref 7.8–10.44)
CHLORIDE SERPL-SCNC: 108 MMOL/L (ref 98–107)
CO2 SERPL-SCNC: 24 MMOL/L (ref 23–31)
CREAT CL PREDICTED SERPL C-G-VRATE: 144 ML/MIN (ref 70–130)
EOSINOPHIL # BLD AUTO: 0 THOU/UL (ref 0–0.7)
EOSINOPHIL NFR BLD AUTO: 0.2 % (ref 0–10)
GLUCOSE SERPL-MCNC: 141 MG/DL (ref 80–115)
HGB BLD-MCNC: 11.6 G/DL (ref 14–18)
LYMPHOCYTES # BLD: 0.7 THOU/UL (ref 1.2–3.4)
LYMPHOCYTES NFR BLD AUTO: 5.8 % (ref 21–51)
MCH RBC QN AUTO: 30.6 PG (ref 27–31)
MCV RBC AUTO: 88.9 FL (ref 80–94)
MONOCYTES # BLD AUTO: 1.1 THOU/UL (ref 0.11–0.59)
MONOCYTES NFR BLD AUTO: 9.1 % (ref 0–10)
NEUTROPHILS # BLD AUTO: 10.7 THOU/UL (ref 1.4–6.5)
NEUTROPHILS NFR BLD AUTO: 84.8 % (ref 42–75)
PLATELET # BLD AUTO: 171 THOU/UL (ref 130–400)
POTASSIUM SERPL-SCNC: 4.1 MMOL/L (ref 3.5–5.1)
RBC # BLD AUTO: 3.8 MILL/UL (ref 4.7–6.1)
SODIUM SERPL-SCNC: 135 MMOL/L (ref 136–145)
WBC # BLD AUTO: 12.6 THOU/UL (ref 4.8–10.8)

## 2018-05-25 RX ADMIN — Medication SCH MLS: at 09:03

## 2018-05-25 RX ADMIN — AMIODARONE HYDROCHLORIDE SCH MLS: 50 INJECTION, SOLUTION INTRAVENOUS at 05:30

## 2018-05-25 RX ADMIN — VANCOMYCIN HYDROCHLORIDE SCH MLS: 1 INJECTION, POWDER, LYOPHILIZED, FOR SOLUTION INTRAVENOUS at 09:03

## 2018-05-25 RX ADMIN — Medication SCH GM: at 05:33

## 2018-05-25 RX ADMIN — PSYLLIUM HUSK SCH: 3.4 POWDER ORAL at 20:44

## 2018-05-25 RX ADMIN — INSULIN HUMAN PRN UNIT: 100 INJECTION, SOLUTION PARENTERAL at 09:04

## 2018-05-25 RX ADMIN — AMIODARONE HYDROCHLORIDE SCH MLS: 50 INJECTION, SOLUTION INTRAVENOUS at 21:53

## 2018-05-25 RX ADMIN — POTASSIUM CHLORIDE, DEXTROSE MONOHYDRATE AND SODIUM CHLORIDE SCH: 150; 5; 450 INJECTION, SOLUTION INTRAVENOUS at 11:54

## 2018-05-25 NOTE — RAD
CHEST ONE VIEW: 

 

History: Post open heart surgery. 

 

Comparison: Prior day. 

 

FINDINGS: 

Similar appearance of the central venous catheter with line extending to the crania to the neck, alth
ough the tip is not visualized. Patient has been extubated. 

 

Mediastinal drains persist. Small left effusion. Mild atelectasis left lung base. No pneumothorax. 

 

IMPRESSION: 

Similar exam of the chest. Interval extubation without complication. 

 

POS: RAHEL

## 2018-05-25 NOTE — EKG
Test Reason : STAT

Blood Pressure : ***/*** mmHG

Vent. Rate : 112 BPM     Atrial Rate : 125 BPM

   P-R Int : 000 ms          QRS Dur : 082 ms

    QT Int : 272 ms       P-R-T Axes : 000 053 000 degrees

   QTc Int : 371 ms

 

Atrial fibrillation with rapid ventricular response

T wave abnormality, consider lateral ischemia

Abnormal ECG

When compared with ECG of 24-MAY-2018 11:51,

Atrial fibrillation has replaced Sinus rhythm

Nonspecific T wave abnormality, worse in Inferior leads

Confirmed by ROB KIRK, DR. LÓPEZ (4) on 5/25/2018 10:14:23 PM

 

Referred By:  DEZ           Confirmed By:DR. MARIBEL RIVAS MD

## 2018-05-25 NOTE — PDOC.CTH
Cardiology Progress Note





- Subjective





He is doing well. He has been extubated since yesterday. He went into afib 

overnight and was started on an amiodarone drip. He converted back to sinus 

since. He is not having much pain. 





- Objective


 Vital Signs











  Temp Pulse Pulse Pulse Resp BP BP


 


 05/25/18 12:00  98.5 F      


 


 05/25/18 08:38    104 H  63   108/79  107/69


 


 05/25/18 08:00  99.5 F  106 H    16  


 


 05/25/18 04:00  99.5 F      














  Pulse Ox Pulse Ox Pulse Ox


 


 05/25/18 12:00   


 


 05/25/18 08:38   97  99


 


 05/25/18 08:00  96  


 


 05/25/18 04:00   








 











Weight                         249 lb 5.485 oz














 











 05/24/18 05/25/18 05/26/18





 06:59 06:59 06:59


 


Intake Total  1820 240


 


Output Total  1705 223


 


Balance  115 17














- Physical Examination


General/Neuro: alert & oriented x3, NAD


Neck: no JVD present


Lungs: CTA, unlabored respirations


Heart: RRR


Abdomen: NT/ND


Extremities: other: (no edema)





- Telemetry


Telemetry Rhythm: NSR





- Labs


Result Diagrams: 


 05/25/18 03:55





 05/25/18 03:55





- Assessment/Plan





1. Severe AS/Moderate AI s/p AVR


2. PFO s/p surgicla repair


3. Type 2 diabetes


4. Sleep apnea


5. Post op afib








PLAN:


- Continue post operative care.


- May go to telemetry floor


- Continue amiodarone drip. Will switch to PO in the morning. 


- If he recurs in the next week will consider full anticoagulation for the next 

month.


- Follow up with me in 1 month after discharge. 


- Will follow.

## 2018-05-26 RX ADMIN — Medication SCH MG: at 09:10

## 2018-05-26 RX ADMIN — PSYLLIUM HUSK SCH: 3.4 POWDER ORAL at 09:13

## 2018-05-26 RX ADMIN — Medication SCH MLS: at 09:09

## 2018-05-27 VITALS — TEMPERATURE: 98.9 F

## 2018-05-27 VITALS — DIASTOLIC BLOOD PRESSURE: 73 MMHG | SYSTOLIC BLOOD PRESSURE: 140 MMHG

## 2018-05-27 RX ADMIN — Medication SCH MG: at 08:43

## 2018-05-28 NOTE — DIS
DATE OF DISCHARGE:  05/27/2018  

 

DIAGNOSES:  Aortic stenosis/insufficiency.

 

PROCEDURES:  Aortic valve replacement with a #25 Magna bioprosthetic valve.

 

DESCRIPTION OF HOSPITAL STAY:  Mr. Flores was admitted for elective valve replacement and repair of pat
ent foramen ovale.  He underwent both procedures simultaneously on 05/24/2018.  Postoperatively, he h
as done well.  He had initial bout of atrial fibrillation which was controlled with amiodarone.  The 
remainder of his hospital stay has been spent recovering from surgery.  At the time of discharge, he 
is ambulatory, tolerating regular diet, having good bowel and bladder function.  Incisions are clean 
and dry without infection.

 

DISCHARGE MEDICATIONS:

1.  Amiodarone 400 mg b.i.d.

2.  Aspirin 325 mg daily.

3.  Norco 5/325 1-2 q. 4h. p.r.n. pain.

4.  Metformin 250 mg b.i.d.

5.  Omeprazole 20 mg every day.

6.  Fish oil every day.

 

Follow up is with me in 2 weeks, Dr. Mi in a month.

## 2018-05-30 LAB
ANALYZER IN CARDIO: (no result)
BASE EXCESS STD BLDA CALC-SCNC: -1.3 MEQ/L
BASE EXCESS STD BLDA CALC-SCNC: -3.4 MEQ/L
BASE EXCESS STD BLDA CALC-SCNC: -3.5 MEQ/L
BASE EXCESS STD BLDV CALC-SCNC: -0.7 MEQ/L
CA-I BLDA-SCNC: 1.1 MMOL/L (ref 1.12–1.3)
CA-I BLDA-SCNC: 1.2 MMOL/L (ref 1.12–1.3)
CA-I BLDA-SCNC: 1.3 MMOL/L (ref 1.12–1.3)
CA-I BLDV-MCNC: 1.06 MMOL/L (ref 1.16–1.32)
CHLORIDE BLDV-SCNC: 104 MMOL/L (ref 98–106)
HCO3 BLDA-SCNC: 20.8 MEQ/L (ref 22–26)
HCO3 BLDA-SCNC: 21.3 MEQ/L (ref 22–26)
HCO3 BLDA-SCNC: 21.5 MEQ/L (ref 22–26)
HCO3 BLDA-SCNC: 23.9 MEQ/L (ref 22–26)
HCO3 BLDV-SCNC: 25 MEQ/L (ref 22–26)
HCT VFR BLDA CALC: 24.8 % (ref 42–52)
HCT VFR BLDA CALC: 25.7 % (ref 42–52)
HCT VFR BLDA CALC: 26.5 % (ref 42–52)
HCT VFR BLDA CALC: 30.9 % (ref 42–52)
HCT VFR BLDA CALC: 34.2 % (ref 42–52)
HCT VFR BLDV CALC: 25.8 % (ref 39–50)
HGB BLDA-MCNC: 10.9 G/DL (ref 14–18)
HGB BLDA-MCNC: 12 G/DL (ref 14–18)
HGB BLDA-MCNC: 9.3 G/DL (ref 14–18)
HGB BLDA-MCNC: 9.5 G/DL (ref 14–18)
HGB BLDA-MCNC: 9.7 G/DL (ref 14–18)
HGB BLDV-MCNC: 9.5 G/DL (ref 13.1–17.2)
PCO2 BLDA: 34.1 MMHG (ref 35–45)
PCO2 BLDA: 34.7 MMHG (ref 35–45)
PCO2 BLDA: 36.2 MMHG (ref 35–45)
PCO2 BLDA: 38.5 MMHG (ref 35–45)
PCO2 BLDA: 42.3 MMHG (ref 35–45)
PH BLDA: 7.36 [PH] (ref 7.35–7.45)
PH BLDA: 7.37 [PH] (ref 7.35–7.45)
PH BLDA: 7.39 [PH] (ref 7.35–7.45)
PH BLDA: 7.4 [PH] (ref 7.35–7.45)
PH BLDA: 7.41 [PH] (ref 7.35–7.45)
PO2 BLDA: 329.4 MMHG (ref 80–100)
PO2 BLDA: 362.6 MMHG (ref 80–100)
PO2 BLDA: 369.8 MMHG (ref 80–100)
PO2 BLDA: 394.8 MMHG (ref 80–100)
POTASSIUM BLDV-SCNC: 4.4 MMOL/L (ref 3.7–5.3)
SODIUM BLDV-SCNC: 140.1 MMOL/L (ref 133–146)
SPECIMEN DRAWN FROM PATIENT: (no result)

## 2019-08-06 ENCOUNTER — HOSPITAL ENCOUNTER (OUTPATIENT)
Dept: HOSPITAL 92 - SCSULT | Age: 64
Discharge: HOME | End: 2019-08-06
Attending: INTERNAL MEDICINE
Payer: COMMERCIAL

## 2019-08-06 DIAGNOSIS — N28.1: ICD-10-CM

## 2019-08-06 DIAGNOSIS — I10: Primary | ICD-10-CM

## 2019-08-06 PROCEDURE — 76770 US EXAM ABDO BACK WALL COMP: CPT

## 2019-08-06 PROCEDURE — 76700 US EXAM ABDOM COMPLETE: CPT

## 2019-08-06 NOTE — ULT
CLINICAL HISTORY: Hypertension.



STUDY: Renal ultrasound and renal artery ultrasound



COMPARISON: None.



TECHNIQUE: Multiplanar grayscale and color Doppler images were obtained in a renal ultrasound. Spectr
al analysis of the Doppler waveforms of the aorta and renal arteries were performed.



FINDINGS:



Right kidney: 

Echogenicity: Normal. 

Masses/cysts:  5.0 cm cyst  

Hydronephrosis: None. 

Calcifications: None.  

Length: 10.7 cm



Left kidney: 

Echogenicity: Normal. 

Masses/cysts:  None.   

Hydronephrosis: None. 

Calcifications: None.  

Length: 11.6 cm



Limited visualization of the urinary bladder is unremarkable.



Peak systolic velocity in the aorta: 70 cm/s

Peak systolic velocity in the right renal artery: 68 cm/s. Right renal artery to aortic ratio: 1.0

Peak systolic velocity in the left renal artery: 57 cm/s. Left renal artery to aortic ratio: 0.8



IMPRESSION:



1. Right renal cyst

2. No evidence of renal artery stenosis



Reported By: Minh Crowley 

Electronically Signed:  8/6/2019 7:55 AM

## 2019-08-06 NOTE — ULT
CLINICAL HISTORY: Hypertension.



STUDY: Renal ultrasound and renal artery ultrasound



COMPARISON: None.



TECHNIQUE: Multiplanar grayscale and color Doppler images were obtained in a renal ultrasound. Spectr
al analysis of the Doppler waveforms of the aorta and renal arteries were performed.



FINDINGS:



Right kidney: 

Echogenicity: Normal. 

Masses/cysts:  5.0 cm cyst  

Hydronephrosis: None. 

Calcifications: None.  

Length: 10.7 cm



Left kidney: 

Echogenicity: Normal. 

Masses/cysts:  None.   

Hydronephrosis: None. 

Calcifications: None.  

Length: 11.6 cm



Limited visualization of the urinary bladder is unremarkable.



Peak systolic velocity in the aorta: 70 cm/s

Peak systolic velocity in the right renal artery: 68 cm/s. Right renal artery to aortic ratio: 1.0

Peak systolic velocity in the left renal artery: 57 cm/s. Left renal artery to aortic ratio: 0.8



IMPRESSION:



1. Right renal cyst

2. No evidence of renal artery stenosis



Reported By: Minh Crowley 

Electronically Signed:  8/6/2019 7:53 AM

## 2023-10-23 NOTE — ULT
CAROTID ULTRASOUND WITH GRAY SCALE AND DOPPLER DUPLEX COLOR FLOW IMAGING

SPECTRAL ANALYSIS PERFORMED:

 

CLINICAL INDICATION: 

Transient. Altered mental status. 

 

COMPARISON:

12-8-09

 

FINDINGS: 

There is no significant atherosclerotic calcification of the carotid arteries.

 

PEAK SYSTOLIC VELOCITY (CM/S):

Right CCA         90

Left CCA          65

Right ICA         60

Left ICA          79

 

There is antegrade flow within the visualized bilateral vertebral arteries.

 

IMPRESSION: 

1.  No hemodynamically significant stenosis of the right internal carotid artery.

2.  No hemodynamically significant stenosis of the left internal carotid artery.

 

POS: RODRÍGUEZ Patient called back due to his concern for HTN, he reports adherence to his ramipril, home BP in recent days have been in the 140s-150s/80s-90s, this afternoon it went up to 173/97, then 171/104. He reports a mild headache which he says is not uncommon and consistent with his current allergy symptoms, he denies dizziness or visual changes. Will add HCTZ 25 mg daily. Advised to go to ED for BP consistently over 180/120 and/or symptoms including worsening HA/dizziness/visual changes. Will schedule office follow-up in 2 weeks.